# Patient Record
Sex: MALE | Race: WHITE | Employment: STUDENT | URBAN - METROPOLITAN AREA
[De-identification: names, ages, dates, MRNs, and addresses within clinical notes are randomized per-mention and may not be internally consistent; named-entity substitution may affect disease eponyms.]

---

## 2018-07-31 ENCOUNTER — OFFICE VISIT (OUTPATIENT)
Dept: PRIMARY CARE CLINIC | Age: 33
End: 2018-07-31

## 2018-07-31 VITALS
WEIGHT: 200.4 LBS | HEIGHT: 64 IN | SYSTOLIC BLOOD PRESSURE: 124 MMHG | DIASTOLIC BLOOD PRESSURE: 84 MMHG | TEMPERATURE: 98.1 F | OXYGEN SATURATION: 98 % | HEART RATE: 72 BPM | BODY MASS INDEX: 34.21 KG/M2

## 2018-07-31 DIAGNOSIS — K21.9 GASTROESOPHAGEAL REFLUX DISEASE WITHOUT ESOPHAGITIS: ICD-10-CM

## 2018-07-31 DIAGNOSIS — Z00.00 ROUTINE GENERAL MEDICAL EXAMINATION AT A HEALTH CARE FACILITY: Primary | ICD-10-CM

## 2018-07-31 PROCEDURE — 99385 PREV VISIT NEW AGE 18-39: CPT | Performed by: NURSE PRACTITIONER

## 2018-07-31 RX ORDER — PANTOPRAZOLE SODIUM 40 MG/1
40 TABLET, DELAYED RELEASE ORAL DAILY
Qty: 30 TABLET | Refills: 3 | Status: SHIPPED | OUTPATIENT
Start: 2018-07-31

## 2018-07-31 RX ORDER — MULTIVIT-MIN/IRON/FOLIC ACID/K 18-600-40
CAPSULE ORAL DAILY
COMMUNITY

## 2018-07-31 ASSESSMENT — ENCOUNTER SYMPTOMS
COUGH: 0
SHORTNESS OF BREATH: 0
ABDOMINAL PAIN: 0
GASTROINTESTINAL NEGATIVE: 1
BACK PAIN: 0
RESPIRATORY NEGATIVE: 1
CONSTIPATION: 0
EYE DISCHARGE: 0
SINUS PRESSURE: 0
NAUSEA: 0
EYES NEGATIVE: 1
DIARRHEA: 0
SINUS PAIN: 0
EYE ITCHING: 0

## 2018-07-31 ASSESSMENT — PATIENT HEALTH QUESTIONNAIRE - PHQ9
2. FEELING DOWN, DEPRESSED OR HOPELESS: 0
1. LITTLE INTEREST OR PLEASURE IN DOING THINGS: 0
SUM OF ALL RESPONSES TO PHQ9 QUESTIONS 1 & 2: 0
SUM OF ALL RESPONSES TO PHQ QUESTIONS 1-9: 0

## 2018-07-31 NOTE — PROGRESS NOTES
[facility] Note    Date: 7/31/2018                                               Subjective/Objective:     Chief Complaint   Patient presents with    New Patient     Former pt of Saul Ramirez with [facility] - last seen 2017    Annual Exam     no new problems       HPI  82-year-old male patient comes in office today to establish care. He is currently a podiatry resident at the [facility] He does have a complaint of GERD. He's been on Protonix in the past. But ran out. He's been using over-the-counter omeprazole. He denies any current nausea vomiting fever or diarrhea. Denies any pain. Denies any other problems. Objective   There are no active problems to display for this patient. History reviewed. No pertinent past medical history. History reviewed. No pertinent surgical history. No results found for any previous visit. Family History   Problem Relation Age of Onset    No Known Problems Mother     Other Father         diverticulitis    High Cholesterol Father     High Cholesterol Sister     High Cholesterol Brother     Diabetes Maternal Uncle     Heart Disease Maternal Grandmother     Cancer Maternal Grandmother         unknown    Heart Attack Maternal Grandfather     High Cholesterol Paternal Grandmother     Heart Disease Paternal Grandfather     High Cholesterol Sister     No Known Problems Sister     No Known Problems Sister     High Cholesterol Brother        Current Outpatient Prescriptions   Medication Sig Dispense Refill    Cholecalciferol (VITAMIN D) 2000 units CAPS capsule Take by mouth daily      pantoprazole (PROTONIX) 40 MG tablet Take 1 tablet by mouth daily 30 tablet 3     No current facility-administered medications for this visit. No Known Allergies    Review of Systems   Constitutional: Negative. Negative for activity change and appetite change. HENT: Negative. Negative for sinus pain and sinus pressure.     Eyes: